# Patient Record
Sex: MALE | Race: WHITE
[De-identification: names, ages, dates, MRNs, and addresses within clinical notes are randomized per-mention and may not be internally consistent; named-entity substitution may affect disease eponyms.]

---

## 2019-07-19 ENCOUNTER — HOSPITAL ENCOUNTER (OUTPATIENT)
Dept: HOSPITAL 84 - D.CATH | Age: 64
Discharge: HOME | End: 2019-07-19
Attending: INTERNAL MEDICINE
Payer: COMMERCIAL

## 2019-07-19 VITALS — HEIGHT: 69 IN | WEIGHT: 240.5 LBS | BODY MASS INDEX: 35.62 KG/M2 | BODY MASS INDEX: 35.62 KG/M2

## 2019-07-19 VITALS — SYSTOLIC BLOOD PRESSURE: 124 MMHG | DIASTOLIC BLOOD PRESSURE: 78 MMHG

## 2019-07-19 DIAGNOSIS — Z01.812: ICD-10-CM

## 2019-07-19 DIAGNOSIS — I48.91: Primary | ICD-10-CM

## 2019-07-19 LAB
ANION GAP SERPL CALC-SCNC: 10.9 MMOL/L (ref 8–16)
BASOPHILS NFR BLD AUTO: 0.6 % (ref 0–2)
BUN SERPL-MCNC: 18 MG/DL (ref 7–18)
CALCIUM SERPL-MCNC: 8.7 MG/DL (ref 8.5–10.1)
CHLORIDE SERPL-SCNC: 103 MMOL/L (ref 98–107)
CO2 SERPL-SCNC: 26.3 MMOL/L (ref 21–32)
CREAT SERPL-MCNC: 0.9 MG/DL (ref 0.6–1.3)
EOSINOPHIL NFR BLD: 2.1 % (ref 0–7)
ERYTHROCYTE [DISTWIDTH] IN BLOOD BY AUTOMATED COUNT: 14.1 % (ref 11.5–14.5)
GLUCOSE SERPL-MCNC: 105 MG/DL (ref 74–106)
HCT VFR BLD CALC: 44.9 % (ref 42–54)
HGB BLD-MCNC: 15.9 G/DL (ref 13.5–17.5)
IMM GRANULOCYTES NFR BLD: 0.2 % (ref 0–5)
INR PPP: 1.15 (ref 0.85–1.17)
LYMPHOCYTES NFR BLD AUTO: 31.2 % (ref 15–50)
MCH RBC QN AUTO: 29.9 PG (ref 26–34)
MCHC RBC AUTO-ENTMCNC: 35.4 G/DL (ref 31–37)
MCV RBC: 84.6 FL (ref 80–100)
MONOCYTES NFR BLD: 9 % (ref 2–11)
NEUTROPHILS NFR BLD AUTO: 56.9 % (ref 40–80)
OSMOLALITY SERPL CALC.SUM OF ELEC: 273 MOSM/KG (ref 275–300)
PLATELET # BLD: 203 10X3/UL (ref 130–400)
PMV BLD AUTO: 10.6 FL (ref 7.4–10.4)
POTASSIUM SERPL-SCNC: 4.2 MMOL/L (ref 3.5–5.1)
PROTHROMBIN TIME: 14.1 SECONDS (ref 11.6–15)
RBC # BLD AUTO: 5.31 10X6/UL (ref 4.2–6.1)
SODIUM SERPL-SCNC: 136 MMOL/L (ref 136–145)
WBC # BLD AUTO: 6.5 10X3/UL (ref 4.8–10.8)

## 2019-07-19 NOTE — HEMODYNAMI
PATIENT:PEDRO LUIS SAMANIEGO                                  MEDICAL RECORD: [id number]
: [birth year]                                            LOCATION:[facility]          
ACCT# [id number]                                       ADMISSION DATE: 19
 
 
 Generatedon:201912:20
Patient name: PEDRO LUIS SAMANIEGO Patient #: [id number] Visit #: [id number] SSN: 
:
1955 Date of study: 2019
Page: Of
Hemodynamic Procedure Report
****************************
Patient Data
Patient Demographics
Procedure consent was obtained
First Name: PEDRO LUIS         Gender: Male
Last Name: DANETTE            : 1955
Middle Initial: W           Age: 63 year(s)
Patient #: [id number]       Race: 
Visit #: [id number]
Accession #:
[id number]
Additional ID: [id number]
Contact details
Address: [address] [place] [address]   Phone: [phone]
State: [place]
City: [place]
Zip code: [address]
Past Medical History
Allergies: No known allergies
Admission
Admission Data
Admission Date: 2019   Admission Time: 10:12
Lab Results
Lab Result Date: 2019  Lab Result Time: 10:45
Biochemistry
Name         Units    Result                Min      Max
BUN          mg/dl    18       --(---*)--   7        18
Creatinine   mg/dl    0.9      --(-*--)--   0.6      1.3
CBC
Name         Units    Result                Min      Max
Hematocrit   %        44.9     --(*---)--   42       54
Hemoglobin   g/dl     15.9     --(--*-)--   13.5     17.5
Procedure
Procedure Types
Cath Procedure
Diagnostic Procedure
Cardioversion External
Procedure Description
Procedure Date
Procedure Date: 2019
Procedure Start Time: 12:06
Procedure End Time: 12:13
Procedure Staff
Name                            Function
Adams Toscano RN                Nurse
Sumit Miller MD                   Additional personnel
Pedro Luis Colbert MD                Performing Physician
Sergey Carolina RT                  Monitor
 
Procedure Data
Cath Procedure
Fluoroscopy
Diagnostic fluoroscopy      Total fluoroscopy Time: 0
time: 0 min                 min
Diagnostic fluoroscopy      Total fluoroscopy dose: 0
dose: 0 mGy                 mGy
Contrast Material
Contrast Material Type                       Amount (ml)
Isovue 370                                   0
Estimated blood loss: 0 ml
Procedure Complications
No complications
Procedure Medications
Medication           Administration Route Dosage
Oxygen               etCO2 Nasal cannula  2 l/min
0.9% NaCl            I.V.                 100 ml/hr
Refer to Anesthesia
Notes for Sedation
Medications
Hemodynamics
Rest
HGB: 15.9 (g/dl) Heart Rate: 64 (bpm)
Snapshots
Pre Cath      Intra         NCS           Post Cath
Vital Signs
Time     Heart  Resp   SPO2 etCO2   NIBP       Rhythm  Pain      Sedation
Rate   (ipm)  (%)  (mmHg)  (mmHg)             Status    Level
(bpm)
12:02:57 71     11     96   0       Measuring  NSR     (Missing) 10(A)
12:03:03 68     10     97   0       122/80(96) NSR     (Missing) 10(A)
12:08:02 70     10     98   0       Measuring  NSR     (Missing) 10(A)
12:08:56 52     7      97   0       110/53(90) NSR     (Missing) 10(A)
12:19:06 50     14     97   0       104/70(79) NSR     (Missing) 10(A)
Medications
Time     Medication  Route   Dose  Verified Delivered Reason    Notes  Effective
ness
by       by
12:04:55 Oxygen      etCO2   2     Pedro Luis Lamas    Per
Nasal   l/min Filemon Toscano RN physician
cannula
12:05:23 0.9% NaCl   I.V.    100   Pedro Luis  Adams    Per
ml/hr Filemon Toscano RN physician
12:05:46 Refer to                  Pedro Luis Lamas    used for
Anesthesia                Filemon Toscano RN procedure
Notes for
Sedation
Medications
Procedure Log
Time     Note
11:49:27 Adams Toscano RN sent for patient. Start room use.
11:49:43 Time tracking: Regular hours (M-F 7:00 - 5:00)
11:49:48 Plan of Care:Hemodynamics will remain stable., Cardiac
rhythm will remain stable., Comfort level will be
maintained., Respiratory function will remain
adequate., Patient/ family verbilizes understanding of
procedure., Procedure tolerated without complication.,
Recovers from procedure without complications..
11:57:32 Signed procedure consent form obtained from patient.
11:57:38 Patient arrived from Pre/Post Procedure Room to CCL 1.
 
Patient remains on bed/stretcher for procedure.
11:57:41 Correct patient and procedure confirmed by team.
11:57:41 Warm blankets applied, and contreras hugger turned on for
patient comfort.
11:57:42 ECG and BP/O2 sat monitors applied to patient.
12:00:08 Sumit Miller MD present and monitoring patient for TIVA.
12:01:09 Vital chart was started
12:01:53 Baseline sample Acquired.
12:01:59 Rhythm: atrial fibrillation
12:02:00 Full Disclosure recording started
12:02:10 H&P Date Dictated: 2019 Within 30 days and on
chart., H&P Addendum completed by physician on day of
procedure. (MUST COMPLETE FOR ALL OUTPATIENTS).
12:02:11 Pre-procedure instructions explained to patient.
12:02:13 Pre-op teaching completed and patient verbalized
understanding.
12:02:14 Family in waiting room.
12:02:17 Patient NPO since Midnight.
12:02:24 Patient allergic to No known allergies
12:02:26 Is the patient allergic to Iodine/contrast media? No.
12:02:27 Is patient on blood thinner?Yes
12:02:30 **ACC** The patient was administered the following
blood thiners within the last 24 hours: Eliquis
12:02:32 Patient diabetic? Yes.
12:02:33 If diabetic: On Metformin? No
12:02:35 Previous problem with sedation/anesthesia? No ?
12:02:37 Snore? Yes
12:02:38 Sleep apnea? No
12:02:40 Opens mouth fully? Yes
12:02:40 Deviated septum? No
12:02:41 Sticks out tongue? Yes
12:02:42 Airway obstruction? No ?
12:02:44 Dentures? No ?
12:02:46 Patient pain scale 0/10 ?.
12:02:49 IV patent on arrival in left forearm with 0.9% NaCl at
[facility].
12:04:33 Lab Result : Hemoglobin 15.9 g/dl
12:04:33 Lab Result : Hematocrit 44.9 %
12:04:33 Lab Result : BUN 18 mg/dl
12:04:33 Lab Result : Creatinine 0.9 mg/dl
12:04:55 Oxygen 2 l/min etCO2 Nasal cannula was administered by
Adams Toscano RN; Per physician;
12:05:23 0.9% NaCl 100 ml/hr I.V. was administered by Adams Toscano RN; Per physician;
12:05:46 Refer to Anesthesia Notes for Sedation Medications was
administered by Adams Toscano RN; used for procedure;
12:05:55 Lab results completed and on chart.
12:06:02 Quick Combo opened to sterile field.
12:06:29 --------ALL STOP TIME OUT------
12:06:29 Physician arrived
12:06:30 Final Timeout: patient, procedure, and site verified
with staff and physician. All members of the team are
in agreement.
12:06:31 Physical assessment completed. ASA score P 3 - A
patient with severe systemic disease as per Pedro Luis Colbert MD.
12:06:34 Fire Safety Assessment: C--Open oxygen or nitrous
oxide is being used.
12:06:38 Sedation plan: TIVA Medication:Propofol
12:06:44 Procedure started.
 
12:06:46 Quick combo pads placed on patients chest and back.
12:08:07 Defibrillator synced and charged to 275 Joules.
12:08:30 Shock delivered.
12:08:49 Patient cardioverted to sinus bradycardia.
12:08:54 Procedure ended.(Physican Out)
12:09:59 Fluoroscopy time 00.00 minutes.
12:10:00 Fluoroscopy dose: 0 mGy
12:10:00 Flurop Dose total: 0
12:10:09 Contrast amount:Isovue 370 0ml.
12:10:13 Post Procedure Pulses reassessed and unchanged
12:10:24 Post-procedure physical assessment completed. ASA
score P 3 - A patient with severe systemic disease as
per Pedro Luis Colbert MD.
12:11:44 Post procedure rhythm: sinus bradycardia
12:11:48 Estimated blood loss: 0 ml
12:12:13 Post procedure instruction explained to
patient.Patient verbalizes understanding.
12:12:14 Patient needs reinforcement of post procedure
teaching.
12:13:14 Procedure and supply charges have been captured,
reviewed, submitted and are correct.
12:13:16 Procedure Complication : No complications
12:13:19 Vital chart was stopped
12:13:43 See physician's report for complete and final results.
12:13:45 Report given to Pre/Post Procedure Room.
12:13:47 Patient transfered to Pre/Post Procedure Room with
Stretcher.
12:13:48 Full Disclosure recording stopped
12:13:48 Procedure ended.
12:13:52 End room use (Document Last)
Device Usage
Item  Manufacture  Quantity  Catalog       Hospital Part    Current Minimal Lot#
 /
Name                         Number        Charge   Number  Stock   Stock   Checo
al#
Code
[employer] 1         [id number]  [id number]   [id number]  [id number]  5
Combo
Signature Audit [place]
Stage           Time        Signature      Unsigned
Intra-Procedure 2019   Sergey Carolina RT(R)
12:14:16 PM RT(R)          2019 12:16:54
PM
Intra-Procedure 2019   Sergey Carolina
12:20:39 PM RT(R)
Signatures
Nurse : Adams Toscano RN Signature :
______________________________
Date : ______________ Time :
______________
Performing Physician :  Signature :
Pedro Luis Colbert MD        ______________________________
Date : ______________ Time :
______________
Monitor : Sergey Carolina RT Signature :
______________________________
 
Date : ______________ Time :
______________
 
 
 
 
 
 
 
 
 
 
 
 
 
 
 
 
 
 
 
 
 
 
 
 
 
 
 
 
 
 
 
 
 
 
 
 
 
 
 
 
 
 
 
 
 
 
 
 
 
 
 
 
 
[address] [place] [address]

## 2019-07-19 NOTE — NUR
PATIENT AWAKE, VSS ON ROOM AIR. SINUS JULIANN ON CM. NO C/O PAIN,
NUMBNESS, OR TINGLING. IV REMOVED. EDUCATION REGARDING DISCHARGE
INSTRUCTIONS GIVEN TO PATIENT AND SPOUSE, BOTH VOICE UNDERSTANDING.

## 2019-07-19 NOTE — NUR
PATIENT AWAKE, EATING SANDWICH, NO N/V. VSS ON ROOM AIR. PHYSICIAN AT
BEDSIDE TO UPDATE PATIENT. SINUS JULIANN ON CM.

## 2019-07-19 NOTE — OP
PATIENT NAME:  PEDRO LUIS SAMANIEGO                           MEDICAL RECORD: [id number]
:[birth year]                                             LOCATION:[facility]          
                                                         ADMISSION DATE:        
SURGEON:  PEDRO LUIS LEONARD MD             
 
 
DATE OF OPERATION:  2019
 
PROCEDURE:  DC cardioversion.
 
INDICATION:  Atrial fibrillation.
 
Continuous heart rate, O2 saturation, blood pressure monitoring all undertaken,
all of which remained stable.  IV conscious sedation was per anesthesia.  He
received 1 shock at 275 joules restoring sinus rhythm.
 
OVERALL IMPRESSION:  Successful DC cardioversion from atrial fibrillation to
sinus rhythm.
 
TRANSINT:[id number] Voice Confirmation ID: [id number] DOCUMENT ID: [id number]
                                           
                                           PEDRO LUIS LEONARD MD             
 
 
 
Electronically Signed by PEDRO LUIS LEONARD on 19 at 1652
 
 
 
 
 
 
 
 
 
 
 
 
 
 
 
 
 
 
 
 
 
 
 
 
CC:                                                             [id number]
DICTATION DATE: 19 1209     :     19 1257      DEP CLI 
                                                                      19
[facility]                                          
[address] [place] [address]

## 2019-10-09 ENCOUNTER — HOSPITAL ENCOUNTER (OUTPATIENT)
Dept: HOSPITAL 84 - D.CATH | Age: 64
Discharge: HOME | End: 2019-10-09
Attending: INTERNAL MEDICINE
Payer: COMMERCIAL

## 2019-10-09 VITALS — HEIGHT: 69 IN | WEIGHT: 240.5 LBS | BODY MASS INDEX: 35.62 KG/M2 | BODY MASS INDEX: 35.62 KG/M2

## 2019-10-09 VITALS — DIASTOLIC BLOOD PRESSURE: 59 MMHG | SYSTOLIC BLOOD PRESSURE: 140 MMHG

## 2019-10-09 DIAGNOSIS — I48.91: Primary | ICD-10-CM

## 2019-10-09 DIAGNOSIS — I10: ICD-10-CM

## 2019-10-09 DIAGNOSIS — E78.5: ICD-10-CM

## 2019-10-09 DIAGNOSIS — F17.200: ICD-10-CM

## 2019-10-09 DIAGNOSIS — K21.9: ICD-10-CM

## 2019-10-09 DIAGNOSIS — E66.9: ICD-10-CM

## 2019-10-09 LAB
ANION GAP SERPL CALC-SCNC: 10.7 MMOL/L (ref 8–16)
BASOPHILS NFR BLD AUTO: 0.3 % (ref 0–2)
BUN SERPL-MCNC: 23 MG/DL (ref 7–18)
CALCIUM SERPL-MCNC: 8.8 MG/DL (ref 8.5–10.1)
CHLORIDE SERPL-SCNC: 103 MMOL/L (ref 98–107)
CO2 SERPL-SCNC: 29.1 MMOL/L (ref 21–32)
CREAT SERPL-MCNC: 1.2 MG/DL (ref 0.6–1.3)
EOSINOPHIL NFR BLD: 1.9 % (ref 0–7)
ERYTHROCYTE [DISTWIDTH] IN BLOOD BY AUTOMATED COUNT: 13.6 % (ref 11.5–14.5)
GLUCOSE SERPL-MCNC: 101 MG/DL (ref 74–106)
HCT VFR BLD CALC: 41.4 % (ref 42–54)
HGB BLD-MCNC: 14 G/DL (ref 13.5–17.5)
IMM GRANULOCYTES NFR BLD: 0.3 % (ref 0–5)
INR PPP: 1.11 (ref 0.85–1.17)
LYMPHOCYTES NFR BLD AUTO: 37.8 % (ref 15–50)
MCH RBC QN AUTO: 30.8 PG (ref 26–34)
MCHC RBC AUTO-ENTMCNC: 33.8 G/DL (ref 31–37)
MCV RBC: 91 FL (ref 80–100)
MONOCYTES NFR BLD: 8.5 % (ref 2–11)
NEUTROPHILS NFR BLD AUTO: 51.2 % (ref 40–80)
OSMOLALITY SERPL CALC.SUM OF ELEC: 279 MOSM/KG (ref 275–300)
PLATELET # BLD: 197 10X3/UL (ref 130–400)
PMV BLD AUTO: 10.1 FL (ref 7.4–10.4)
POTASSIUM SERPL-SCNC: 4.8 MMOL/L (ref 3.5–5.1)
PROTHROMBIN TIME: 13.8 SECONDS (ref 11.6–15)
RBC # BLD AUTO: 4.55 10X6/UL (ref 4.2–6.1)
SODIUM SERPL-SCNC: 138 MMOL/L (ref 136–145)
WBC # BLD AUTO: 5.8 10X3/UL (ref 4.8–10.8)

## 2019-10-09 NOTE — NUR
PT HAS TOLERATED 100% OF SANDWICH WITH NO C/O NAUSEA. SITTING UP IN
BED, VISITING WITH WIFE AT BEDSIDE. DENIES NEEDS AT THIS TIME.

## 2019-10-09 NOTE — NUR
DC INSTRUCTIONS REVIEWED WITH PT AND WIFE WHO VERBALIZE
UNDERSTANDING. PT IS ALERT AND DENIES ANY C/O. IV DC'D WITH CATH
INTACT AND PT IS DRESSING FOR DC TO HOME.

## 2019-10-09 NOTE — NUR
PT ALERT, DENIES ANY C/O. SITTING UP IN BED, WATCHING TV AND VISITING
WITH WIFE. NSR, RATE IS 60, DENIES ANY C/O CHEST PAIN. CALL LIGHT IN
REACH.

## 2019-10-09 NOTE — NUR
1350 PT HAS AMBULATED TO THE BATHROOM AND VOIDED QS. PT IS ALERT AND
DENIES ANY C/O. PT ESCORTED TO PRIVATE AUTO VIA WC WITH WIFE DRIVING
HIM HOME. PT HAS DC INSTRUCTIONS AND ALL PERSONAL BELONGINGS AT TIME
OF DISCHARGE.

## 2019-10-09 NOTE — HEMODYNAMI
PATIENT:PEDRO LUIS SAMANIEGO                                  MEDICAL RECORD: [id number]
: [birth year]                                            LOCATION:[facility]          
ACCT# [id number]                                       ADMISSION DATE: 10/09/19
 
 
 Generatedon:10/10/89324:49
Patient name: PEDRO LUIS SAMANIEGO Patient #: [id number] Visit #: [id number] SSN: 
:
1955 Date of study: 10/9/2019
Page: Of
Hemodynamic Procedure Report
****************************
Patient Data
Patient Demographics
Procedure consent was obtained
First Name: PEDRO LUIS         Gender: Male
Last Name: DANETTE            : 1955
Middle Initial: W           Age: 63 year(s)
Patient #: [id number]       Race: 
Visit #: [id number]
Accession #:
[id number]
Additional ID: [id number]
Contact details
Address: [address] [place] [address]   Phone: [phone]
State: [place]
City: [place]
Zip code: [address]
Past Medical History
Allergies: No known allergies
Admission
Admission Data
Admission Date: 10/9/2019   Admission Time: 10:02
Arrival Date: 10/9/2019     Arrival Time: 12:30
Admit Source: Other         Insurance Payor: Private
health insurance
Height (in.): 70            BSA: 2.28 (m2)
Height (cm.): 177.8         BMI: 35.15 (kg/m2)
Weight (lbs.): 245
Weight (kg.): 111.13
Lab Results
Lab Result Date: 10/9/2019  Lab Result Time: 0:00
Biochemistry
Name         Units    Result                Min      Max
BUN          mg/dl    23       --(----)-*   7        18
Creatinine   mg/dl    1.2      --(---*)--   0.6      1.3
eGFR         ml/min   64.70363 *-(----)--   90       120
NONAFRICAN
CBC
Name         Units    Result                Min      Max
Hemoglobin   g/dl     14       --(*---)--   13.5     17.5
Procedure
Procedure Types
Cath Procedure
Diagnostic Procedure
Cardioversion External
Procedure Description
Procedure Date
Procedure Date: 10/9/2019
 
Procedure Start Time: 11:48
Procedure End Time: 11:55
Procedure Staff
Name                            Function
Gema Valenzuela RT                    Monitor
Trae Davis CRNA              Additional personnel
Pedro Luis Colbert MD                Performing Physician
Earnest Kang RN              Nurse
Indication
Atrial fibrillation
Procedure Data
Cath Procedure
Estimated blood loss: 0 ml
Procedure Complications
No complications
Procedure Medications
Medication           Administration Route Dosage
0.9% NaCl            I.V.                 100 ml/hr
Oxygen               etCO2 Nasal cannula  5 l/min
Refer to Anesthesia
Notes for Sedation
Medications
Hemodynamics
Rest
HGB: 14 (g/dl) O2 Consumption: Estimated: 252.03 (ml/min) O2 Consumption indexed
:
Estimated:110.54 (ml/min/m) Heart Rate: 53 (bpm)
Snapshots
Pre Cath      Intra         NCS           Post Cath
Vital Signs
Time     Heart  Resp   SPO2 etCO2   NIBP (mmHg) Rhythm    Pain    Sedation
Rate   (ipm)  (%)  (mmHg)                        Status  Level
(bpm)
11:43:12 57     16     98   0       114/81(101) A-Flutter 0 (11)  10(A)
, No
pain
11:47:18 60     18     99   30.8    105/69(81)  A-Flutter 0 (11)  10(A)
, No
pain
11:51:18 63     16     99   29.2    112/80(89)  A-Flutter 0 (11)  10(A)
, No
pain
11:55:21 47     18     94   32.2    103/75(89)  A-Flutter 0 (11)  8(A)
, No
pain
11:57:30 49     16     95   29      110/61(105) NSR       0 (11)  8(A)
, No
pain
Medications
Time     Medication  Route   Dose  Verified Delivered Reason    Notes  Effective
ness
by       by
11:47:09 0.9% NaCl   I.V.    100   Earnest  Earnest   Per
ml/hr Pearl Kang   physician
RN       RN
11:47:20 Oxygen      etCO2   5     Earnest  Earnest   for low
Nasal   l/min Pearl Kang   02 sats
cannula       RN       RN
11:49:33 Refer to                  Earnest  Earnest   for
Anesthesia                Pearl Kang   sedation
 
Notes for                 RN       RN
Sedation
Medications
Procedure Log
Time     Note
11:23:40 Diagnostic Cath Status : Elective
11:24:07 Indication : Atrial fibrillation
11:24:21 Procedure Status Cardioversion.
11:24:25 Alise Riddle RN sent for patient. Start room use.
11:24:27 Time tracking: Regular hours (M-F 7:00 - 5:00)
11:24:32 Plan of Care:Hemodynamics will remain stable., Cardiac
rhythm will remain stable., Comfort level will be
maintained., Respiratory function will remain
adequate., Patient/ family verbilizes understanding of
procedure., Procedure tolerated without complication.,
Recovers from procedure without complications..
11:24:49 Admit Source: Other
11:24:51 Arrival Date: 10/9/2019 12:30:00 PM
11:25:04 Insurance Payor : Private health insurance
11:25:11 Patient Height : 70 inches
11:25:17 Patient Weight : 245 lbs
11:38:20 Patient received from Pre/Post Procedure Room to CCL 2
Alert and oriented. Tansferred to table in Supine
position.
11:38:23 Signed procedure consent form obtained from patient.
11:38:24 Warm blankets applied, and contreras hugger turned on for
patient comfort.
11:38:25 Correct patient and procedure confirmed by team.
11:38:26 ECG and BP/O2 sat monitors applied to patient.
11:40:46 Lab Result : eGFR NONAFRICAN 64.81941 ml/min
11:40:46 Lab Result : Hemoglobin 14 g/dl
11:40:46 Lab Result : BUN 23 mg/dl
11:40:46 Lab Result : Creatinine 1.2 mg/dl
11:40:57 Full Disclosure recording started
11:41:01 H&P Date Dictated: 10/9/2019 Within 30 days and on
chart., H&P Addendum completed by physician on day of
procedure. (MUST COMPLETE FOR ALL OUTPATIENTS).
11:41:03 Pre-op teaching completed and patient verbalized
understanding.
11:41:03 Pre-procedure instructions explained to patient.
11:41:05 Family in waiting room.
11:41:09 Patient NPO since Midnight.
11:41:12 Is the patient allergic to Iodine/contrast media? No.
11:41:13 Was the patient premedicated? No
11:41:16 Is patient on blood thinner?Yes
11:41:17 **ACC** The patient was administered the following
blood thiners within the last 24 hours: Eliquis
11:42:12 Vital chart was started
11:42:20 Baseline sample Acquired.
11:42:24 Rhythm: atrial flutter
11:43:06 Patient diabetic? Yes.
11:43:07 If diabetic: On Metformin? No
11:43:10 Previous problem with sedation/anesthesia? No ?
11:43:11 Snore? Yes
11:43:48 Sleep apnea? No
11:43:49 Deviated septum? No
11:43:51 Opens mouth fully? Yes
11:43:52 Sticks out tongue? Yes
11:43:54 Airway obstruction? No ?
11:44:01 Dentures? No ?
 
11:44:23 IV patent on arrival in left forearm with 0.9% NaCl at
[facility].
11:44:26 Lab results completed and on chart.
11:44:31 Sharps counted by scrub and verified by R.N.
11:44:31 Alarms reviewed by R. N.
11:45:10 --------ALL STOP TIME OUT------
11:45:32 Physician paged
11:47:09 0.9% NaCl 100 ml/hr I.V. was administered by Earnest Kang RN; Per physician; Verbal order read back and
verified.
11:47:20 Oxygen 5 l/min etCO2 Nasal cannula was administered by
Earnest Kang RN; for low 02 sats; Verbal order read
back and verified.
11:48:09 Physician arrived
11:48:11 Final Timeout: patient, procedure, and site verified
with staff and physician. All members of the team are
in agreement.
11:48:17 Fire Safety Assessment: A--An alcohol-based skin
anteseptic being used preoperatively., C--Open oxygen
or nitrous oxide is being used., D--An ESU, laser, or
fiber-optic light is being used.
11:48:21 Physical assessment completed. ASA score P 2 - A
patient with mild systemic disease as per Pedro Luis Colbert MD.
11:48:26 Sedation plan: TIVA Medication:Propofol
11:48:41 Procedure started.
11:48:50 Quick Combo opened to sterile field.
11:49:33 Refer to Anesthesia Notes for Sedation Medications was
administered by Earnest Kang RN; for sedation;
Verbal order read back and verified.
11:52:20 Trae Davis CRNA present and monitoring patient for
TIVA.
11:52:21 Quick combo pads placed on patients chest and back.
11:52:24 Defibrillator synced and charged to 275 Joules.
11:52:35 Shock delivered.
11:53:09 Unsuccessful cardioversion.
11:53:12 Defibrillator synced and charged to 360 Joules.
11:53:29 Shock delivered.
11:53:47 Patient cardioverted to sinus rhythm .
11:54:00 Procedure ended.(Physican Out)
11:54:17 Post procedure rhythm: sinus rhythm
11:54:20 Estimated blood loss: 0 ml
11:54:22 Post procedure instruction explained to
patient.Patient verbalizes understanding.
11:54:24 Patient needs reinforcement of post procedure
teaching.
11:54:49 Procedure and supply charges have been captured,
reviewed, submitted and are correct.
11:54:54 Procedure Complication : No complications
11:55:03 Vital chart was stopped
11:55:04 Operative report dictated upon procedure completion.
11:55:05 See physician's report for complete and final results.
11:55:08 Report given to Pre/Post Procedure Room.
11:55:11 Patient transfered to Pre/Post Procedure Room with
Stretcher.
11:55:13 Full Disclosure recording stopped
11:55:13 Procedure ended.
11:56:30 End room use (Document Last)
Device Usage
Item  Manufacture  Quantity  Catalog       Hospital Part    Current Minimal Lot#
 
 /
Name                         Number        Charge   Number  Stock   Stock   [employer]
al#
Code
[employer] 1         [id number]  [id number]   [id number]  [id number]  5
Combo
Signature Audit [place]
Stage           Time        Signature      Unsigned
Intra-Procedure 10/9/2019   Gema Valenzuela
11:55:37 AM RT(R)
Intra-Procedure 10/9/2019   Earnest
11:56:30 AM Pearl PEREZ
Intra-Procedure 10/9/2019   Pedro Luis Colbert
12:03:33 PM MD; Gema Valenzuela
RT(R)
Signatures
Monitor : Gema Valenzuela RT   Signature :
______________________________
Date : ______________ Time :
______________
Performing Physician :  Signature :
Pedro Luis Colbert MD        ______________________________
Date : ______________ Time :
______________
Nurse : Earnest Lorigan  Signature :
RN                       ______________________________
Date : ______________ Time :
______________
 
 
 
 
 
 
 
 
 
 
 
 
 
 
 
 
 
 
 
 
 
 
 
 
 
 
 
[address]                           
[place] [address]

## 2019-10-09 NOTE — NUR
1225 PT IS ALERT, SITTING UP IN BED, EATING SANDWICH AND VISITING
WITH HIS WIFE. DENIES ANY C/O CHEST PAIN OR NAUSEA. SINUS JULIANN AT
47.

## 2019-10-10 NOTE — OP
PATIENT NAME:  PEDRO LUIS SAMANIEGO                           MEDICAL RECORD: [id number]
:[birth year]                                             LOCATION:D.CAT          
                                                         ADMISSION DATE:        
SURGEON:  PEDRO LUIS LEONARD MD             
 
 
DATE OF OPERATION:  10/09/2019
 
PROCEDURE:  DC cardioversion.
 
INDICATION:  Atrial fibrillation.
 
DESCRIPTION OF PROCEDURE:  The patient received 1 shock at 275 joules restoring
sinus rhythm.
 
TRANSINT:[id number] Voice Confirmation ID: [id number] DOCUMENT ID: [id number]
                                           
                                           PEDRO LUIS LEONARD MD             
 
 
 
Electronically Signed by PEDRO LUIS LEONARD on 10/10/19 at 1331
 
 
 
 
 
 
 
 
 
 
 
 
 
 
 
 
 
 
 
 
 
 
 
 
 
 
 
 
CC:                                                             [id number]
DICTATION DATE: 10/09/19 1156     :     10/09/19 1243      DEP CLI 
                                                                      10/09/19
[address] [place] [address]